# Patient Record
(demographics unavailable — no encounter records)

---

## 2021-04-23 NOTE — NUR
PT GIVEN WARM BLANKETS, IN POSITION OF COMFORT. CALL LIGHT WITHIN REACH AND BED
LOW. SIDE RAILS X2. LIGHTS DIMMED FOR COMFORT. UPDATED ON PLAN OF CARE. 
DENIES CURRENT NEEDS.

## 2021-04-24 NOTE — NUR
Lying in bed, awake/alert/oriented, assist to T/R self ad ru, cont of B/B
with BRPs with assist ad ru, denies pain/other discomfort at this time, call
light/phone/water within reach, no s/s of acute distress observed.

## 2021-04-24 NOTE — NUR
ASSESSED AT THE TIME OF ARRIVAL FROM THE ER. PT IS ALERT AND ORIENTED, ABLE TO
VERBALIZE NEEDS. VERY OTIENTED AND NO PROBLEM WITH HEARING. SHE HAS BEEN
ASSISTED UP TO THE BATHROOM TO VOID AND HAD A BM. THERE IS NEED OF STAND BY
ASSIST FOR SAFTEY. HER DAUGHTER-IN-LAW IS ASSISTING HER TO THE BATHAROOM WHEN
WE ARE NOT CALLED. ALL QUESTIONS HAVE BEEN ANSWERED WITH NO COGNITIVE PROBLEMS
NOTED.

## 2021-04-24 NOTE — NUR
Called to room, assisted to bathroom, assisted to change into pajama bottoms,
assisted to wash hands/face then assisted to bed, reoriented on the plan of
her daughter-in-law to return tonight to spend the night with her, reoriented
to use of call light before getting up or if she needs anything, she states
again that she understands.

## 2021-04-24 NOTE — NUR
INITAIL ROUNDS COMPLETEDA T 1915 HRS.  PT UP IN ROOM.  ORIENTED TO PERSON,
ONLY.  REORIENTED TO TIME. PLACE AND SITUATION.  GAIT STEADY.  PACED RHTYM
WITH
UNDERLYING A-FIB PER CM HR 72.  IV TO L WRIST WITH NS AT 75CC/HR.  IV PATENT.
DENIES ANY DISCOMFORT.  CALL LIGHT WITHIN REACH.

## 2021-04-24 NOTE — NUR
Pt daughter asking about pt going home this afternoon, this nurse check
physician's notes but found no note/order stating such, reported back to
daughter who requested that Dr. Walker be called and questioned about them
going home, called Dr. Walker who stated he was ok with pt being discharged if
ok with primary attending. Called OUMOU Carrillo who stated emphatically that Dr. Sesay wanted pt to stay another day, went to room and explained to pt
daughter when son asked why I stated to monitor her and make sure she does not
have another episode of hypotension or low HR, son had additional questions
and wants to speak directly with attending, called OUMOU Carrillo and transferred
call to room.

## 2021-04-25 NOTE — MORECARE
CASE MANAGEMENT DISCHARGE SUMMARY
 
 
PATIENT: SHIRLEY CHAUHAN                    UNIT: [id number]
ACCOUNT#: [id number]                       ADM DATE: 21
AGE: 87     : 34  SEX: F            ROOM/BED: D.2616    
AUTHOR: ANTHONY EDEN                             PHYSICIAN:                               
 
REFERRING PHYSICIAN: FELIX POWERS MD           
DATE OF SERVICE: 21
Case Management Discharge Planning Summary
 
 
COMMENTS 
ENTERED DATE:  21  12:34 CT
COMMENT TYPE:  Discharge Planning
REVIEWER: Sandy Meeks
  
CM met with patient and daughter, Venice Rob ([phone]) to complete 
DC plan and to evaluate needs.  Patient lives with her daughter (Venice) and 
daughter's  in their home.  Patient's daughter stated that her home 
is safe and has electricity and running water.  Patient's daughter stated 
that she has no problems paying for the patient's medications and she fills 
them at [facility] on [facility]. Patient stated that her primary care 
physician is Dr. Woods.  At discharge, the patient plans to return to her 
daughter's home. The patient and her daughter feel this is a safe discharge. 
 CM discussed availability of home health, rehab services, and medical 
equipment. Patient's daughter declined HHS, SNF, IPR, and DME and stated she 
would like to return home to let her mother resume her pre-admission routine.
 Patient uses a cane to assist with ambulation as needed. Venice stated she 
encourages the patient to ambulate frequently at home to maintain her 
strength.  Patient voiced no other needs at this time and is satisfied with 
DC plan. Venice will provide transportation after discharge. CM will continue 
to follow and will assist as needed with dc plans/needs.
 
 
DCP REVIEW SUMMARY
ANTICIPATED D/C DATE: 2021
EXPECTED LOS : 2
CASE STATUS:  DCP Complete
INITIAL REVIEW:  2021
INITIAL REVIEWER:   Sandy Meeks
FINAL DISCHARGE DISPOSITION:  : 
FINAL REVIEWER:  
FINAL REVIEW DATE: 
 
  
 
DCP Focus Questions & Answers
 
 
DCP Screen
QUESTION: ANSWER
High Risk Factors: : None
Walking limitation:  Patient stated self rated walking limitation present? : 
No
Age: : 80 +
Prior living environment: : Lives with others
Disability ranking: : Grade 2:  Slight disability
 
 
 
DCP Evaluation
QUESTION: ANSWER
Patient and/or caregiver agree upon recommended discharge plan? : Yes
Patient's current cognitive status: : *Oriented to person, place, situation, 
time and present
Patient's ability to cope with chronic illness : a. Adequate (0-3 ED visits 
in 6 mos., adequate financial resources, attends scheduled appts.)
Patient gives permission to discuss discharge plans with:  (name, 
relationship and number) : Venice Coe
Family / Caregiver's ability to cope with chronic illness: : a. Adequate 
(ability to meet patient's medical needs, ensures patient attends medical 
appts.)
Does the patient have the ability to pay for or attain post discharge needs 
/ services? : Yes
Functional screen assessment: : Basic needs can adequately be met by self
Family / Caregiver's ability to cope with chronic illness: : a. Adequate 
(ability to meet patient's medical needs, ensures patient attends medical 
appts.)
Physical Status: : Independent with ADL's
Equipment needed for post hospitalization: : None
Is there a likelihood that the patient will require additional services to 
return to the preadmission environment? : No
Living Arrangements: : Home with others
Results of this evaluation have been discussed with: : Patient
Results of this evaluation have been discussed with: : Children
Patient with capacity for self-care or can be cared for in same environment 
as prior to hospitalization? : Yes
Baseline cognitive status: : *Oriented to person, place, situation, time and 
present
Living arrangements comments: : Lives with Venice coe and her 

Comments: : eVnice Coe was present and participated in 
discussion
Physical environment modification needed / anticipated for discharge: : No
Medication Management: : Patient states can afford medications
Planned post hospital services available for patient? : N/A
Pharmacy name(s): : [facility] on [address]
Planned post hospital services covered by insurance plan? : N/A
Does Patient have transportation to get home and to follow-up medical 
appointments when discharged from the hospital? : Yes
Comments: : Daughter provides transportation
 
Would patient like to participate in any Care Coordination programs (if 
applicable): : Not applicable
Does the patient have electricity at home? : Yes
Does the patient have running water in their house? : Yes
Equipment in use: : Walker - Standard
Equipment in use: : Cane - Quad
Mental health screen: : No mental health history
 
 
 
DCP Re-evaluation
QUESTION: ANSWER
Would patient like to participate in any Care Coordination programs (if 
applicable): : Not applicable
 
 
 
 
 
 
 
PATIENT:  SHIRLEY CHAUHAN MARILYN
ENCOUNTER:  [id number]
MEDICAL RECORD#:  [id number]
ADMISSION DATE:  2021
DISCHARGE DATE:  2021
ATTENDING MD:  
REYNA:   
AGE:  87
MARITAL STATUS:   W
DC PLAN ID:  [id number]
FACILITY:   [facility]
PRINTED ON: 21  17:19  CT
 
 
 
 
 
 
 
 
**All edits/amendments must be made on the electronic document**
 
DICTATION DATE: 21     : TEENA  21     
RPT#: [id number]                                DC DATE:21
                                               STATUS: DIS IN  
[facility]
[address] [place] [address]
***END OF REPORT***

## 2021-04-25 NOTE — MORECARE
CASE MANAGEMENT DISCHARGE SUMMARY
 
 
PATIENT: SHIRLEY CHAUHAN                    UNIT: [id number]
ACCOUNT#: [id number]                       ADM DATE: 21
AGE: 87     : 34  SEX: F            ROOM/BED: D.2316    
AUTHOR: ANTHONY EDEN                             PHYSICIAN:                               
 
REFERRING PHYSICIAN: FELIX POWERS MD           
DATE OF SERVICE: 21
Case Management Discharge Planning Summary
 
 
COMMENTS 
ENTERED DATE:  21  12:34 CT
COMMENT TYPE:  Discharge Planning
REVIEWER: Sandy Meeks
  
CM met with patient and daughter, Venice Rob ([phone]) to complete 
DC plan and to evaluate needs.  Patient lives with her daughter (Venice) and 
daughter's  in their home.  Patient's daughter stated that her home 
is safe and has electricity and running water.  Patient's daughter stated 
that she has no problems paying for the patient's medications and she fills 
them at [facility] on [facility]. Patient stated that her primary care 
physician is Dr. Woods.  At discharge, the patient plans to return to her 
daughter's home. The patient and her daughter feel this is a safe discharge. 
 CM discussed availability of home health, rehab services, and medical 
equipment. Patient's daughter declined HHS, SNF, IPR, and DME and stated she 
would like to return home to let her mother resume her pre-admission routine.
 Patient uses a cane to assist with ambulation as needed. Venice stated she 
encourages the patient to ambulate frequently at home to maintain her 
strength.  Patient voiced no other needs at this time and is satisfied with 
DC plan. Venice will provide transportation after discharge. CM will continue 
to follow and will assist as needed with dc plans/needs.
 
 
DCP REVIEW SUMMARY
ANTICIPATED D/C DATE: 2021
EXPECTED LOS : 2
CASE STATUS:  DCP Complete
INITIAL REVIEW:  2021
INITIAL REVIEWER:   Sandy Meeks
FINAL DISCHARGE DISPOSITION:  : 
FINAL REVIEWER:  
FINAL REVIEW DATE: 
 
  
 
DCP Focus Questions & Answers
 
 
DCP Screen
QUESTION: ANSWER
High Risk Factors: : None
Walking limitation:  Patient stated self rated walking limitation present? : 
No
Age: : 80 +
Prior living environment: : Lives with others
Disability ranking: : Grade 2:  Slight disability
 
 
 
DCP Evaluation
QUESTION: ANSWER
Patient and/or caregiver agree upon recommended discharge plan? : Yes
Family / Caregiver's ability to cope with chronic illness: : a. Adequate 
(ability to meet patient's medical needs, ensures patient attends medical 
appts.)
Patient's current cognitive status: : *Oriented to person, place, situation, 
time and present
Patient gives permission to discuss discharge plans with:  (name, 
relationship and number) : Venice Coe
Patient's ability to cope with chronic illness : a. Adequate (0-3 ED visits 
in 6 mos., adequate financial resources, attends scheduled appts.)
Does the patient have the ability to pay for or attain post discharge needs 
/ services? : Yes
Functional screen assessment: : Basic needs can adequately be met by self
Family / Caregiver's ability to cope with chronic illness: : a. Adequate 
(ability to meet patient's medical needs, ensures patient attends medical 
appts.)
Physical Status: : Independent with ADL's
Equipment needed for post hospitalization: : None
Is there a likelihood that the patient will require additional services to 
return to the preadmission environment? : No
Living Arrangements: : Home with others
Results of this evaluation have been discussed with: : Patient
Results of this evaluation have been discussed with: : Children
Patient with capacity for self-care or can be cared for in same environment 
as prior to hospitalization? : Yes
Living arrangements comments: : Lives with Venice coe and her 

Baseline cognitive status: : *Oriented to person, place, situation, time and 
present
Physical environment modification needed / anticipated for discharge: : No
Comments: : Venice Ceo was present and participated in 
discussion
Medication Management: : Patient states can afford medications
Planned post hospital services available for patient? : N/A
Pharmacy name(s): : [facility] on [address]
Planned post hospital services covered by insurance plan? : N/A
Does Patient have transportation to get home and to follow-up medical 
appointments when discharged from the hospital? : Yes
Comments: : Daughter provides transportation
 
Would patient like to participate in any Care Coordination programs (if 
applicable): : Not applicable
Does the patient have electricity at home? : Yes
Does the patient have running water in their house? : Yes
Equipment in use: : Walker - Standard
Equipment in use: : Cane - Quad
Mental health screen: : No mental health history
 
 
 
DCP Re-evaluation
QUESTION: ANSWER
Would patient like to participate in any Care Coordination programs (if 
applicable): : Not applicable
 
 
 
 
 
 
 
PATIENT:  SHIRLEY CHAUHAN MARILYN
ENCOUNTER:  [id number]
MEDICAL RECORD#:  [id number]
ADMISSION DATE:  2021
DISCHARGE DATE:  
ATTENDING MD:  
REYNA:   
AGE:  87
MARITAL STATUS:   W
DC PLAN ID:  [id number]
FACILITY:   [facility]
PRINTED ON: 21  12:45  CT
 
 
 
 
 
 
 
 
**All edits/amendments must be made on the electronic document**
 
DICTATION DATE: 21     : TEENA  21     
RPT#: [id number]                                DC DATE:        
                                               STATUS: ADM IN  
[facility]
 [place] [address]
***END OF REPORT***

## 2021-04-25 NOTE — MORECARE
CASE MANAGEMENT DISCHARGE SUMMARY
 
 
PATIENT: SHIRLEY CHAUHAN                    UNIT: [id number]
ACCOUNT#: [id number]                       ADM DATE: 21
AGE: 87     : 34  SEX: F            ROOM/BED: D.5686    
AUTHOR: MEEKDOC                             PHYSICIAN:                               
 
REFERRING PHYSICIAN: FELIX POWERS MD           
DATE OF SERVICE: 21
Case Management Discharge Planning Summary
 
 
 
 
 
DCP REVIEW SUMMARY
ANTICIPATED D/C DATE: 2021
EXPECTED LOS : 2
CASE STATUS:  DCP Complete
INITIAL REVIEW:  2021
INITIAL REVIEWER:   Sandy Meeks
FINAL DISCHARGE DISPOSITION:  : 
FINAL REVIEWER:  
FINAL REVIEW DATE: 
 
  
 
DCP Focus Questions & Answers
 
DCP Screen
QUESTION: ANSWER
High Risk Factors: : None
Walking limitation:  Patient stated self rated walking limitation present? : 
No
Age: : 80 +
Prior living environment: : Lives with others
Disability ranking: : Grade 2:  Slight disability
 
 
 
DCP Evaluation
QUESTION: ANSWER
Patient and/or caregiver agree upon recommended discharge plan? : Yes
Family / Caregiver's ability to cope with chronic illness: : a. Adequate 
(ability to meet patient's medical needs, ensures patient attends medical 
appts.)
Patient's current cognitive status: : *Oriented to person, place, situation, 
time and present
Patient gives permission to discuss discharge plans with:  (name, 
 
relationship and number) : DaughterVenice
Patient's ability to cope with chronic illness : a. Adequate (0-3 ED visits 
in 6 mos., adequate financial resources, attends scheduled appts.)
Does the patient have the ability to pay for or attain post discharge needs 
/ services? : Yes
Functional screen assessment: : Basic needs can adequately be met by self
Family / Caregiver's ability to cope with chronic illness: : a. Adequate 
(ability to meet patient's medical needs, ensures patient attends medical 
appts.)
Physical Status: : Independent with ADL's
Equipment needed for post hospitalization: : None
Is there a likelihood that the patient will require additional services to 
return to the preadmission environment? : No
Living Arrangements: : Home with others
Results of this evaluation have been discussed with: : Patient
Results of this evaluation have been discussed with: : Children
Patient with capacity for self-care or can be cared for in same environment 
as prior to hospitalization? : Yes
Living arrangements comments: : Lives with daughterVenice and her 

Baseline cognitive status: : *Oriented to person, place, situation, time and 
present
Physical environment modification needed / anticipated for discharge: : No
Comments: : Daughter, Venice Rob was present and participated in 
discussion
Medication Management: : Patient states can afford medications
Planned post hospital services available for patient? : N/A
Pharmacy name(s): : [facility] on [address]
Planned post hospital services covered by insurance plan? : N/A
Does Patient have transportation to get home and to follow-up medical 
appointments when discharged from the hospital? : Yes
Comments: : Daughter provides transportation
Would patient like to participate in any Care Coordination programs (if 
applicable): : Not applicable
Does the patient have electricity at home? : Yes
Does the patient have running water in their house? : Yes
Equipment in use: : Walker - Standard
Equipment in use: : Cane - Quad
Mental health screen: : No mental health history
 
 
 
DCP Re-evaluation
QUESTION: ANSWER
Would patient like to participate in any Care Coordination programs (if 
applicable): : Not applicable
 
 
 
 
 
 
 
 
PATIENT:  SHIRLEY CHAUHAN
ENCOUNTER:  [id number]
MEDICAL RECORD#:  [id number]
ADMISSION DATE:  2021
DISCHARGE DATE:  
ATTENDING MD:  
REYNA:   
AGE:  87
MARITAL STATUS:   W
DC PLAN ID:  [id number]
FACILITY:   [facility]
PRINTED ON: 21  12:34  CT
 
 
 
 
 
 
 
 
**All edits/amendments must be made on the electronic document**
 
DICTATION DATE: 21 123     : TEENA  21 1234     
RPT#: [id number]                                DC DATE:        
                                               STATUS: ADM IN  
[facility]
 [place] [address]
***END OF REPORT***

## 2021-04-25 NOTE — NUR
PT'S DISCHARGE INSTRUCTIONS REVIEWED WITH PT AND DAUGHTER IN LAW PRIOR TO
SIGNING.  IV OUT AND TELEMETRY REMOVED.  WHEELED TO ER FOR TRANSPORT HOME.

## 2021-04-25 NOTE — NUR
PT AWAKE EASILY TO VERBAL STIMULI.  DENIES ANY DISCOMFORT.  ORIENTED TO PERSON
ONLY.  REORIENTED TO PLACE, TIME AND SITUATION.  FAMILY AT BEDSIDE.  SR UP X2,
CALL LIGHT WITHIN REACH.

## 2021-04-28 NOTE — MORECARE
CASE MANAGEMENT DISCHARGE SUMMARY
 
 
PATIENT: SHIRLEY CHAUHAN                    UNIT: [id number]
ACCOUNT#: [id number]                       ADM DATE: 21
AGE: 87     : 34  SEX: F            ROOM/BED: D.8216    
AUTHOR: ANTHONY EDEN                             PHYSICIAN:                               
 
REFERRING PHYSICIAN: FELIX POWERS MD           
DATE OF SERVICE: 21
Case Management Discharge Planning Summary
 
 
COMMENTS 
ENTERED DATE:  21  12:34 CT
COMMENT TYPE:  Discharge Planning
REVIEWER: Sandy Meeks
  
CM met with patient and daughter, Venice Rob ([phone]) to complete 
DC plan and to evaluate needs.  Patient lives with her daughter (Venice) and 
daughter's  in their home.  Patient's daughter stated that her home 
is safe and has electricity and running water.  Patient's daughter stated 
that she has no problems paying for the patient's medications and she fills 
them at [facility] on [facility]. Patient stated that her primary care 
physician is Dr. Woods.  At discharge, the patient plans to return to her 
daughter's home. The patient and her daughter feel this is a safe discharge. 
 CM discussed availability of home health, rehab services, and medical 
equipment. Patient's daughter declined HHS, SNF, IPR, and DME and stated she 
would like to return home to let her mother resume her pre-admission routine.
 Patient uses a cane to assist with ambulation as needed. Venice stated she 
encourages the patient to ambulate frequently at home to maintain her 
strength.  Patient voiced no other needs at this time and is satisfied with 
DC plan. Venice will provide transportation after discharge. CM will continue 
to follow and will assist as needed with dc plans/needs.
 
 
DCP REVIEW SUMMARY
ANTICIPATED D/C DATE: 2021
EXPECTED LOS : 2
CASE STATUS:  DCP Complete
INITIAL REVIEW:  2021
INITIAL REVIEWER:   Sandy Meeks
FINAL DISCHARGE DISPOSITION:  : 
FINAL REVIEWER:  
FINAL REVIEW DATE: 
 
  
 
DCP Focus Questions & Answers
 
 
DCP Screen
QUESTION: ANSWER
High Risk Factors: : None
Walking limitation:  Patient stated self rated walking limitation present? : 
No
Age: : 80 +
Prior living environment: : Lives with others
Disability ranking: : Grade 2:  Slight disability
 
 
 
DCP Evaluation
QUESTION: ANSWER
Patient and/or caregiver agree upon recommended discharge plan? : Yes
Family / Caregiver's ability to cope with chronic illness: : a. Adequate 
(ability to meet patient's medical needs, ensures patient attends medical 
appts.)
Patient's current cognitive status: : *Oriented to person, place, situation, 
time and present
Patient gives permission to discuss discharge plans with:  (name, 
relationship and number) : Venice Coe
Patient's ability to cope with chronic illness : a. Adequate (0-3 ED visits 
in 6 mos., adequate financial resources, attends scheduled appts.)
Does the patient have the ability to pay for or attain post discharge needs 
/ services? : Yes
Functional screen assessment: : Basic needs can adequately be met by self
Family / Caregiver's ability to cope with chronic illness: : a. Adequate 
(ability to meet patient's medical needs, ensures patient attends medical 
appts.)
Physical Status: : Independent with ADL's
Equipment needed for post hospitalization: : None
Is there a likelihood that the patient will require additional services to 
return to the preadmission environment? : No
Living Arrangements: : Home with others
Results of this evaluation have been discussed with: : Patient
Results of this evaluation have been discussed with: : Children
Patient with capacity for self-care or can be cared for in same environment 
as prior to hospitalization? : Yes
Living arrangements comments: : Lives with Venice coe and her 

Baseline cognitive status: : *Oriented to person, place, situation, time and 
present
Physical environment modification needed / anticipated for discharge: : No
Comments: : Venice Coe was present and participated in 
discussion
Medication Management: : Patient states can afford medications
Planned post hospital services available for patient? : N/A
Pharmacy name(s): : [facility] on [address]
Planned post hospital services covered by insurance plan? : N/A
Does Patient have transportation to get home and to follow-up medical 
appointments when discharged from the hospital? : Yes
Comments: : Daughter provides transportation
 
Would patient like to participate in any Care Coordination programs (if 
applicable): : Not applicable
Does the patient have electricity at home? : Yes
Does the patient have running water in their house? : Yes
Equipment in use: : Walker - Standard
Equipment in use: : Cane - Quad
Mental health screen: : No mental health history
 
 
 
DCP Re-evaluation
QUESTION: ANSWER
Would patient like to participate in any Care Coordination programs (if 
applicable): : Not applicable
 
 
 
 
 
 
 
PATIENT:  SHIRLEY CHAUHAN MARILYN
ENCOUNTER:  [id number]
MEDICAL RECORD#:  [id number]
ADMISSION DATE:  2021
DISCHARGE DATE:  2021
ATTENDING MD:  
REYNA:   [id number]
AGE:  87
MARITAL STATUS:   W
DC PLAN ID:  [id number]
FACILITY:   [facility]
PRINTED ON: 21  20:22  CT
 
 
 
 
 
 
 
 
**All edits/amendments must be made on the electronic document**
 
DICTATION DATE: 21     : TEENA  21     
RPT#: [id number]                                DC DATE:21
                                               STATUS: DIS IN  
[facility]
[address] [place] [address]
***END OF REPORT***

## 2021-05-03 NOTE — NUR
REPORT RECEIVED. PT A&O X4; HAS MOMENTS OF CONFUSION BUT EASILY REORIENTED.
DAUGHTER IN LAW AT BEDSIDE. NO S/S OF DISTRESS OBSERVED. RR EVEN & UNLABORED
ON RA. R FA IV PATENT WITH NS AND K INFUSING. TELE PACED AT 65. BED LOCKED
AND LOWERED, CL IN REACH. ASSESSMENT COMPLETE. WILL CONT POC.

## 2021-05-04 NOTE — MORECARE
CASE MANAGEMENT DISCHARGE SUMMARY
 
 
PATIENT: SHIRLEY DOSS                    UNIT: [id number]
ACCOUNT#: [id number]                       ADM DATE: 21
AGE: 87     : 34  SEX: F            ROOM/BED: D0    
AUTHOR: ANTHONY EDEN                             PHYSICIAN:                               
 
REFERRING PHYSICIAN: NICOL SOTO MD                
DATE OF SERVICE: 21
Case Management Discharge Planning Summary
 
 
 
 
 
DCP REVIEW SUMMARY
ANTICIPATED D/C DATE: 
EXPECTED LOS : 
CASE STATUS:  DCP Complete
INITIAL REVIEW:  2021
INITIAL REVIEWER:   Kayley Wilks
FINAL DISCHARGE DISPOSITION:  : 
FINAL REVIEWER:  
FINAL REVIEW DATE: 
 
  
 
DCP Focus Questions & Answers
 
DCP Screen
QUESTION: ANSWER
High Risk Factors: : Readmission within past 30 days
 
 
 
DCP Evaluation
QUESTION: ANSWER
Patient's ability to cope with chronic illness : d. No chronic illness
Would patient like to participate in any Care Coordination programs (if 
applicable): : Not applicable
Mental health screen: : No mental health history
 
 
 
DCP Re-evaluation
QUESTION: ANSWER
Would patient like to participate in any Care Coordination programs (if 
applicable): : Not applicable
 
 
 
 
 
 
 
 
PATIENT:  SHIRLEY DOSS
ENCOUNTER:  [id number]
MEDICAL RECORD#:  [id number]
ADMISSION DATE:  2021
DISCHARGE DATE:  
ATTENDING MD:  NICOL URRUTIA
:   
AGE:  87
MARITAL STATUS:   W
DC PLAN ID:  [id number]
FACILITY:   [facility]
PRINTED ON: 21  16:06  CT
 
 
 
 
 
 
 
 
**All edits/amendments must be made on the electronic document**
 
DICTATION DATE: 21     : TEENA  21 160     
RPT#: [id number]                                DC DATE:        
                                               STATUS: ADM IN  
[facility]
 [place] [address]
***END OF REPORT***

## 2021-05-04 NOTE — NUR
REPORT RECEIVED. PT A&O, UP IN BED WITH SON AT BEDSIDE. NO S/S OF DISTRESS
OBSERVED. RR EVEN & UNLABORED ON 2L. IV TO R FA INFUSING NS @ 150. K STAT
REDRAW WAS 2.6. GAVE 1/3 20MEQ K PILLS. WILL TREAT PER EMAR. PT WAS BATHED AND
LATHERED WITH LOTION. BED LOCKED AND LOWERED. CL IN REACH. ASSESSMENT
COMPLETE. WILL CONT POC.

## 2021-05-04 NOTE — NUR
Lying in bed, awake/alert/confused, daughter-in-law at bedside, T/R with
assist ad ru, cont of B/B with use of BSC with assist ad ru, does have
episodes of incont while sleeping from time-to-time, denies pain/other
discomfort at this time, medicated as ordered (See MAR), call
light/phone/water within reach, no s/s of acute distress observed.

## 2021-05-04 NOTE — MORECARE
CASE MANAGEMENT DISCHARGE SUMMARY
 
 
PATIENT: SHIRLEY MENDEZ                    UNIT: [id number]
ACCOUNT#: [id number]                       ADM DATE: 21
AGE: 87     : 34  SEX: F            ROOM/BED: D.2110    
AUTHOR: MEEKDOC                             PHYSICIAN:                               
 
REFERRING PHYSICIAN: NICOL SOTO MD                
DATE OF SERVICE: 21
Case Management Discharge Planning Summary
 
 
COMMENTS 
ENTERED DATE:  21  16:08 CT
COMMENT TYPE:  Discharge Planning
REVIEWER: Kayley Wilks
DC PLAN: Would like either inpatient rehab at [facility] or SNF  
ANTICIPATED DC NEEDS: IP rehab or SNF  
  
CM met with patient and her DIL to completed DC planning/needs. CM educated 
patient on the CM role and verbal consent given by patient to complete 
assessment.  CM verified patient's address, phone number, and emergency 
contact phone numbers.  Patient lives at home with her DIL, Venice and her 
son.   CM discussed availability of home health, rehab services, and medical 
equipment. Patient and DIL state she will need some rehab. I discussed IP 
rehab vs skilled and provided a list of facilities. Daughter in law would 
like to discuss this with her  and let me know tomorrow of their 
choice. CM will continue to follow and assist with discharge planning/needs.
 
 
DCP REVIEW SUMMARY
ANTICIPATED D/C DATE: 
EXPECTED LOS : 
CASE STATUS:  DCP Complete
INITIAL REVIEW:  2021
INITIAL REVIEWER:   Kayley Wilks
FINAL DISCHARGE DISPOSITION:  : 
FINAL REVIEWER:  
FINAL REVIEW DATE: 
 
  
 
DCP Focus Questions & Answers
 
DCP Screen
QUESTION: ANSWER
High Risk Factors: : Readmission within past 30 days
 
 
 
 
DCP Evaluation
QUESTION: ANSWER
Patient gives permission to discuss discharge plans with:  (name, 
relationship and number) : Venice Mendez - DIL - [phone]
Patient's ability to cope with chronic illness : a. Adequate (0-3 ED visits 
in 6 mos., adequate financial resources, attends scheduled appts.)
Patient's current cognitive status: : Alert
Patient and/or caregiver agree upon recommended discharge plan? : Yes
Physical Status: : Mobility impaired
Physical Status: : Partial care dependence
Family / Caregiver's ability to cope with chronic illness: : a. Adequate 
(ability to meet patient's medical needs, ensures patient attends medical 
appts.)
Functional screen assessment: : New onset in difficulty in gait, balance, or 
transfer difficulties
Does the patient have the ability to pay for or attain post discharge needs 
/ services? : Yes
Partial Dependence, assistance required for: : Ambulation / Mobility
Partial Dependence, assistance required for: : Bathing
Partial Dependence, assistance required for: : Dressing
Living Arrangements: : Home with others
Is there a likelihood that the patient will require additional services to 
return to the preadmission environment? : Yes
Equipment needed for post hospitalization: : None
Living arrangements comments: : Lives with her son and DIL
Baseline cognitive status: : Alert
Results of this evaluation have been discussed with: : Family
Results of this evaluation have been discussed with: : Patient
Physical environment modification needed / anticipated for discharge: : No
Medication Management: : Patient states the need for assistance with 
medication administration
Pharmacy name(s): : PCP is Dr. Woods
Planned post hospital services available for patient? : Yes
Does Patient have transportation to get home and to follow-up medical 
appointments when discharged from the hospital? : Yes
Would patient like to participate in any Care Coordination programs (if 
applicable): : Not applicable
Does the patient have electricity at home? : Yes
Does the patient have running water in their house? : Yes
Equipment in use: : Cane - Single Leg
Equipment in use: : Walker - Rolling
Mental health screen: : No mental health history
Psychosocial status: : Adult with cognitive limitations
Psychosocial status: : Adult with physical limitations
Abuse/Neglect: : None
Resources / Services in place: : None
 
 
 
DCP Re-evaluation
QUESTION: ANSWER
 
Would patient like to participate in any Care Coordination programs (if 
applicable): : Not applicable
 
 
 
 
 
 
 
PATIENT:  SHIRLEY MENDEZ
ENCOUNTER:  [id number]
MEDICAL RECORD#:  [id number]
ADMISSION DATE:  2021
DISCHARGE DATE:  
ATTENDING MD:  NICOL URRUTIA
:   
AGE:  87
MARITAL STATUS:   W
DC PLAN ID:  [id number]
FACILITY:   [facility]
PRINTED ON: 21  16:18  CT
 
 
 
 
 
 
 
 
**All edits/amendments must be made on the electronic document**
 
DICTATION DATE: 21     : TEENA  21     
RPT#: [id number]                                DC DATE:        
                                               STATUS: ADM IN  
[facility]
 [place] [address]
***END OF REPORT***

## 2021-05-05 NOTE — NUR
REPORT RECEIVED, PT A&O UP IN BED COMPLETEING CROSSWORD PUZZLES. NO S/S OF
DISTRESS OBSERVED. RR EVEN & UNLABORED ON RA. IV TO R FA PATENT AND INFUSING
NS . BED LOCKED AND LOWERED, CL IN REACH. ASSESSMENT COMPLETE. WILL CONT
POC.

## 2021-05-05 NOTE — MORECARE
CASE MANAGEMENT DISCHARGE SUMMARY
 
 
PATIENT: SHIRLEY MENDEZ                    UNIT: [id number]
ACCOUNT#: [id number]                       ADM DATE: 21
AGE: 87     : 34  SEX: F            ROOM/BED: D.2110    
AUTHOR: MEEK,DOC                             PHYSICIAN:                               
 
REFERRING PHYSICIAN: NICOL SOTO MD                
DATE OF SERVICE: 21
Case Management Discharge Planning Summary
 
 
COMMENTS 
ENTERED DATE:  21  16:08 CT
COMMENT TYPE:  Discharge Planning
REVIEWER: Kayley Wilks
DC PLAN: Would like either inpatient rehab at [facility] or SNF  
ANTICIPATED DC NEEDS: IP rehab or SNF  
  
CM met with patient and her DIL to completed DC planning/needs. CM educated 
patient on the CM role and verbal consent given by patient to complete 
assessment.  CM verified patient's address, phone number, and emergency 
contact phone numbers.  Patient lives at home with her DIL, Venice and her 
son.   CM discussed availability of home health, rehab services, and medical 
equipment. Patient and DIL state she will need some rehab. I discussed IP 
rehab vs skilled and provided a list of facilities. Daughter in law would 
like to discuss this with her  and let me know tomorrow of their 
choice. CM will continue to follow and assist with discharge planning/needs.
 
 
DCP REVIEW SUMMARY
ANTICIPATED D/C DATE: 
EXPECTED LOS : 
CASE STATUS:  DCP Complete
INITIAL REVIEW:  2021
INITIAL REVIEWER:   Kayley Wilks
FINAL DISCHARGE DISPOSITION:  : 
FINAL REVIEWER:  
FINAL REVIEW DATE: 
 
  
 
DCP Focus Questions & Answers
 
DCP Screen
QUESTION: ANSWER
High Risk Factors: : Readmission within past 30 days
 
 
 
 
DCP Evaluation
QUESTION: ANSWER
Patient and/or caregiver agree upon recommended discharge plan? : Yes
Patient's current cognitive status: : Alert
Patient's ability to cope with chronic illness : a. Adequate (0-3 ED visits 
in 6 mos., adequate financial resources, attends scheduled appts.)
Patient gives permission to discuss discharge plans with:  (name, 
relationship and number) : [place] Mendez - DIL - [phone]
Does the patient have the ability to pay for or attain post discharge needs 
/ services? : Yes
Functional screen assessment: : New onset in difficulty in gait, balance, or 
transfer difficulties
Family / Caregiver's ability to cope with chronic illness: : a. Adequate 
(ability to meet patient's medical needs, ensures patient attends medical 
appts.)
Physical Status: : Mobility impaired
Physical Status: : Partial care dependence
Equipment needed for post hospitalization: : None
Is there a likelihood that the patient will require additional services to 
return to the preadmission environment? : Yes
Living Arrangements: : Home with others
Partial Dependence, assistance required for: : Ambulation / Mobility
Partial Dependence, assistance required for: : Bathing
Partial Dependence, assistance required for: : Dressing
Results of this evaluation have been discussed with: : Family
Results of this evaluation have been discussed with: : Patient
Baseline cognitive status: : Alert
Living arrangements comments: : Lives with her son and DIL
Physical environment modification needed / anticipated for discharge: : No
Medication Management: : Patient states the need for assistance with 
medication administration
Planned post hospital services available for patient? : Yes
Pharmacy name(s): : PCP is Dr. Woods
Does Patient have transportation to get home and to follow-up medical 
appointments when discharged from the hospital? : Yes
Would patient like to participate in any Care Coordination programs (if 
applicable): : Not applicable
Does the patient have electricity at home? : Yes
Does the patient have running water in their house? : Yes
Equipment in use: : Cane - Single Leg
Equipment in use: : Walker - Rolling
Mental health screen: : No mental health history
Psychosocial status: : Adult with cognitive limitations
Psychosocial status: : Adult with physical limitations
Abuse/Neglect: : None
Resources / Services in place: : None
 
 
 
DCP Re-evaluation
QUESTION: ANSWER
 
Would patient like to participate in any Care Coordination programs (if 
applicable): : Not applicable
 
 
 
 
 
 
 
PATIENT:  SHIRLEY MENDEZ
ENCOUNTER:  [id number]
MEDICAL RECORD#:  [id number]
ADMISSION DATE:  2021
DISCHARGE DATE:  
ATTENDING MD:  NICOL URRUTIA
:   
AGE:  87
MARITAL STATUS:   W
DC PLAN ID:  [id number]
FACILITY:   [facility]
PRINTED ON: 21  12:06  CT
 
 
 
 
 
 
 
 
**All edits/amendments must be made on the electronic document**
 
DICTATION DATE: 21     : TEENA  21     
RPT#: [id number]                                DC DATE:        
                                               STATUS: ADM IN  
[facility]
 [place] [address]
***END OF REPORT***

## 2021-05-05 NOTE — MORECARE
CASE MANAGEMENT DISCHARGE SUMMARY
 
 
PATIENT: SHIRLEY MENDEZ                    UNIT: [id number]
ACCOUNT#: [id number]                       ADM DATE: 21
AGE: 87     : 34  SEX: F            ROOM/BED: D.7900    
AUTHOR: MEEK,DOC                             PHYSICIAN:                               
 
REFERRING PHYSICIAN: NICOL SOTO MD                
DATE OF SERVICE: 21
Case Management Discharge Planning Summary
 
 
COMMENTS 
ENTERED DATE:  21  15:42 CT
COMMENT TYPE:  Discharge Planning
REVIEWER: Kayley Lashaun
Still no therapy notes to fax to [facility]. CM called [place] with  PT. CM 
will need to fax therapy notes when available.
__________________________________________________
ENTERED DATE:  21  12:06 CT
COMMENT TYPE:  Discharge Planning
REVIEWER: Kayley Lashaun
CM met with daughter and she has chosen [facility]. States her mother has 
been there before. CM spoke with PT and they will document their notes, they 
will need faxed to [facility]. I called [facility] and left a voice mail for 
Celeste Fagan that I have faxed a new referral ready for DC. CM will 
continue to follow and assist with discharge planning/needs.
__________________________________________________
ENTERED DATE:  21  16:08 CT
COMMENT TYPE:  Discharge Planning
REVIEWER: Kayley Lashaun
DC PLAN: Would like either inpatient rehab at [facility] or SNF  
ANTICIPATED DC NEEDS: IP rehab or SNF  
  
CM met with patient and her DIL to completed DC planning/needs. CM educated 
patient on the CM role and verbal consent given by patient to complete 
assessment.  CM verified patient's address, phone number, and emergency 
contact phone numbers.  Patient lives at home with her DIL, Venice and her 
son.   CM discussed availability of home health, rehab services, and medical 
equipment. Patient and DIL state she will need some rehab. I discussed IP 
rehab vs skilled and provided a list of facilities. Daughter in law would 
like to discuss this with her  and let me know tomorrow of their 
choice. CM will continue to follow and assist with discharge planning/needs.
 
 
DCP REVIEW SUMMARY
ANTICIPATED D/C DATE: 
EXPECTED LOS : 
CASE STATUS:  DCP Complete
 
INITIAL REVIEW:  2021
INITIAL REVIEWER:   Kayley Wilks
FINAL DISCHARGE DISPOSITION:  : 
FINAL REVIEWER:  
FINAL REVIEW DATE: 
 
  
 
DCP Focus Questions & Answers
 
DCP Screen
QUESTION: ANSWER
High Risk Factors: : Readmission within past 30 days
 
 
 
DCP Evaluation
QUESTION: ANSWER
Patient and/or caregiver agree upon recommended discharge plan? : Yes
Patient's current cognitive status: : Alert
Patient's ability to cope with chronic illness : a. Adequate (0-3 ED visits 
in 6 mos., adequate financial resources, attends scheduled appts.)
Patient gives permission to discuss discharge plans with:  (name, 
relationship and number) : [place] Mendez - DIL - [phone]
Does the patient have the ability to pay for or attain post discharge needs 
/ services? : Yes
Functional screen assessment: : New onset in difficulty in gait, balance, or 
transfer difficulties
Family / Caregiver's ability to cope with chronic illness: : a. Adequate 
(ability to meet patient's medical needs, ensures patient attends medical 
appts.)
Physical Status: : Mobility impaired
Physical Status: : Partial care dependence
Equipment needed for post hospitalization: : None
Is there a likelihood that the patient will require additional services to 
return to the preadmission environment? : Yes
Living Arrangements: : Home with others
Partial Dependence, assistance required for: : Ambulation / Mobility
Partial Dependence, assistance required for: : Bathing
Partial Dependence, assistance required for: : Dressing
Results of this evaluation have been discussed with: : Family
Results of this evaluation have been discussed with: : Patient
Baseline cognitive status: : Alert
Living arrangements comments: : Lives with her son and DIL
Physical environment modification needed / anticipated for discharge: : No
Medication Management: : Patient states the need for assistance with 
medication administration
Planned post hospital services available for patient? : Yes
Pharmacy name(s): : PCP is Dr. Woods
Does Patient have transportation to get home and to follow-up medical 
appointments when discharged from the hospital? : Yes
Would patient like to participate in any Care Coordination programs (if 
 
applicable): : Not applicable
Does the patient have electricity at home? : Yes
Does the patient have running water in their house? : Yes
Equipment in use: : Cane - Single Leg
Equipment in use: : Walker - Rolling
Mental health screen: : No mental health history
Psychosocial status: : Adult with cognitive limitations
Psychosocial status: : Adult with physical limitations
Abuse/Neglect: : None
Resources / Services in place: : None
 
 
 
DCP Re-evaluation
QUESTION: ANSWER
Would patient like to participate in any Care Coordination programs (if 
applicable): : Not applicable
 
 
 
 
 
 
 
PATIENT:  SHIRLEY MENDEZ
ENCOUNTER:  [id number]
MEDICAL RECORD#:  [id number]
ADMISSION DATE:  2021
DISCHARGE DATE:  
ATTENDING MD:  NICOL URRUTIA
:   
AGE:  87
MARITAL STATUS:   W
DC PLAN ID:  [id number]
 
FACILITY:   [facility]
PRINTED ON: 21  16:30  CT
 
 
 
 
 
 
 
 
**All edits/amendments must be made on the electronic document**
 
DICTATION DATE: 21     : TEENA  21 163     
RPT#: [id number]                                DC DATE:        
                                               STATUS: ADM IN  
[facility]
 [place] [address]
***END OF REPORT***

## 2021-05-05 NOTE — MORECARE
CASE MANAGEMENT DISCHARGE SUMMARY
 
 
PATIENT: SHIRLEY MENDEZ                    UNIT: [id number]
ACCOUNT#: [id number]                       ADM DATE: 21
AGE: 87     : 34  SEX: F            ROOM/BED: D.0720    
AUTHOR: MEEK,DOC                             PHYSICIAN:                               
 
REFERRING PHYSICIAN: NICOL SOTO MD                
DATE OF SERVICE: 21
Case Management Discharge Planning Summary
 
 
COMMENTS 
ENTERED DATE:  21  15:42 CT
COMMENT TYPE:  Discharge Planning
REVIEWER: Kayley Lashaun
Still no therapy notes to fax to [facility]. CM called [place] with  PT. CM 
will need to fax therapy notes when available.
__________________________________________________
ENTERED DATE:  21  12:06 CT
COMMENT TYPE:  Discharge Planning
REVIEWER: Kayley Lashaun
CM met with daughter and she has chosen [facility]. States her mother has 
been there before. CM spoke with PT and they will document their notes, they 
will need faxed to [facility]. I called [facility] and left a voice mail for 
Celeste Fagan that I have faxed a new referral ready for DC. CM will 
continue to follow and assist with discharge planning/needs.
__________________________________________________
ENTERED DATE:  21  16:08 CT
COMMENT TYPE:  Discharge Planning
REVIEWER: Kayley Lashaun
DC PLAN: Would like either inpatient rehab at [facility] or SNF  
ANTICIPATED DC NEEDS: IP rehab or SNF  
  
CM met with patient and her DIL to completed DC planning/needs. CM educated 
patient on the CM role and verbal consent given by patient to complete 
assessment.  CM verified patient's address, phone number, and emergency 
contact phone numbers.  Patient lives at home with her DIL, Venice and her 
son.   CM discussed availability of home health, rehab services, and medical 
equipment. Patient and DIL state she will need some rehab. I discussed IP 
rehab vs skilled and provided a list of facilities. Daughter in law would 
like to discuss this with her  and let me know tomorrow of their 
choice. CM will continue to follow and assist with discharge planning/needs.
 
 
DCP REVIEW SUMMARY
ANTICIPATED D/C DATE: 
EXPECTED LOS : 
CASE STATUS:  DCP Complete
 
INITIAL REVIEW:  2021
INITIAL REVIEWER:   Kayley Wilks
FINAL DISCHARGE DISPOSITION:  : 
FINAL REVIEWER:  
FINAL REVIEW DATE: 
 
  
 
DCP Focus Questions & Answers
 
DCP Screen
QUESTION: ANSWER
High Risk Factors: : Readmission within past 30 days
 
 
 
DCP Evaluation
QUESTION: ANSWER
Patient and/or caregiver agree upon recommended discharge plan? : Yes
Patient's current cognitive status: : Alert
Patient's ability to cope with chronic illness : a. Adequate (0-3 ED visits 
in 6 mos., adequate financial resources, attends scheduled appts.)
Patient gives permission to discuss discharge plans with:  (name, 
relationship and number) : [place] Mendez - DIL - [phone]
Does the patient have the ability to pay for or attain post discharge needs 
/ services? : Yes
Functional screen assessment: : New onset in difficulty in gait, balance, or 
transfer difficulties
Family / Caregiver's ability to cope with chronic illness: : a. Adequate 
(ability to meet patient's medical needs, ensures patient attends medical 
appts.)
Physical Status: : Mobility impaired
Physical Status: : Partial care dependence
Equipment needed for post hospitalization: : None
Is there a likelihood that the patient will require additional services to 
return to the preadmission environment? : Yes
Living Arrangements: : Home with others
Partial Dependence, assistance required for: : Ambulation / Mobility
Partial Dependence, assistance required for: : Bathing
Partial Dependence, assistance required for: : Dressing
Results of this evaluation have been discussed with: : Family
Results of this evaluation have been discussed with: : Patient
Baseline cognitive status: : Alert
Living arrangements comments: : Lives with her son and DIL
Physical environment modification needed / anticipated for discharge: : No
Medication Management: : Patient states the need for assistance with 
medication administration
Planned post hospital services available for patient? : Yes
Pharmacy name(s): : PCP is Dr. Woods
Does Patient have transportation to get home and to follow-up medical 
appointments when discharged from the hospital? : Yes
Would patient like to participate in any Care Coordination programs (if 
 
applicable): : Not applicable
Does the patient have electricity at home? : Yes
Does the patient have running water in their house? : Yes
Equipment in use: : Cane - Single Leg
Equipment in use: : Walker - Rolling
Mental health screen: : No mental health history
Psychosocial status: : Adult with cognitive limitations
Psychosocial status: : Adult with physical limitations
Abuse/Neglect: : None
Resources / Services in place: : None
 
 
 
DCP Re-evaluation
QUESTION: ANSWER
Would patient like to participate in any Care Coordination programs (if 
applicable): : Not applicable
 
 
 
 
 
 
 
PATIENT:  SHIRLEY MENDEZ
ENCOUNTER:  [id number]
MEDICAL RECORD#:  [id number]
ADMISSION DATE:  2021
DISCHARGE DATE:  
ATTENDING MD:  NICOL URRUTIA
:   
AGE:  87
MARITAL STATUS:   W
DC PLAN ID:  [id number]
 
FACILITY:   [facility]
PRINTED ON: 21  15:53  CT
 
 
 
 
 
 
 
 
**All edits/amendments must be made on the electronic document**
 
DICTATION DATE: 21     : TEENA  21     
RPT#: [id number]                                DC DATE:        
                                               STATUS: ADM IN  
[facility]
 [place] [address]
***END OF REPORT***

## 2021-05-05 NOTE — MORECARE
CASE MANAGEMENT DISCHARGE SUMMARY
 
 
PATIENT: SHIRLEY MENDEZ                    UNIT: [id number]
ACCOUNT#: [id number]                       ADM DATE: 21
AGE: 87     : 34  SEX: F            ROOM/BED: D.9730    
AUTHOR: ANTHONY EDEN                             PHYSICIAN:                               
 
REFERRING PHYSICIAN: NICOL SOTO MD                
DATE OF SERVICE: 21
Case Management Discharge Planning Summary
 
 
COMMENTS 
ENTERED DATE:  21  12:06 CT
COMMENT TYPE:  Discharge Planning
REVIEWER: Kayley Wilks
CM met with daughter and she has chosen [facility]. States her mother has 
been there before. CM spoke with PT and they will document their notes, they 
will need faxed to [facility]. I called [facility] and left a voice mail for 
Celeste Fagan that I have faxed a new referral ready for DC. CM will 
continue to follow and assist with discharge planning/needs.
__________________________________________________
ENTERED DATE:  21  16:08 CT
COMMENT TYPE:  Discharge Planning
REVIEWER: Kayley Wilks
DC PLAN: Would like either inpatient rehab at [facility] or SNF  
ANTICIPATED DC NEEDS: IP rehab or SNF  
  
CM met with patient and her DIL to completed DC planning/needs. CM educated 
patient on the CM role and verbal consent given by patient to complete 
assessment.  CM verified patient's address, phone number, and emergency 
contact phone numbers.  Patient lives at home with her DIL, Venice and her 
son.   CM discussed availability of home health, rehab services, and medical 
equipment. Patient and DIL state she will need some rehab. I discussed IP 
rehab vs skilled and provided a list of facilities. Daughter in law would 
like to discuss this with her  and let me know tomorrow of their 
choice. CM will continue to follow and assist with discharge planning/needs.
 
 
DCP REVIEW SUMMARY
ANTICIPATED D/C DATE: 
EXPECTED LOS : 
CASE STATUS:  DCP Complete
INITIAL REVIEW:  2021
INITIAL REVIEWER:   Kayley Wilks
FINAL DISCHARGE DISPOSITION:  : 
FINAL REVIEWER:  
FINAL REVIEW DATE: 
 
 
  
 
DCP Focus Questions & Answers
 
DCP Screen
QUESTION: ANSWER
High Risk Factors: : Readmission within past 30 days
 
 
 
DCP Evaluation
QUESTION: ANSWER
Patient and/or caregiver agree upon recommended discharge plan? : Yes
Patient's current cognitive status: : Alert
Patient's ability to cope with chronic illness : a. Adequate (0-3 ED visits 
in 6 mos., adequate financial resources, attends scheduled appts.)
Patient gives permission to discuss discharge plans with:  (name, 
relationship and number) : Venice Mendez - DIL - [phone]
Does the patient have the ability to pay for or attain post discharge needs 
/ services? : Yes
Functional screen assessment: : New onset in difficulty in gait, balance, or 
transfer difficulties
Family / Caregiver's ability to cope with chronic illness: : a. Adequate 
(ability to meet patient's medical needs, ensures patient attends medical 
appts.)
Physical Status: : Mobility impaired
Physical Status: : Partial care dependence
Equipment needed for post hospitalization: : None
Is there a likelihood that the patient will require additional services to 
return to the preadmission environment? : Yes
Living Arrangements: : Home with others
Partial Dependence, assistance required for: : Ambulation / Mobility
Partial Dependence, assistance required for: : Bathing
Partial Dependence, assistance required for: : Dressing
Results of this evaluation have been discussed with: : Family
Results of this evaluation have been discussed with: : Patient
Baseline cognitive status: : Alert
Living arrangements comments: : Lives with her son and LEEANNA
Physical environment modification needed / anticipated for discharge: : No
Medication Management: : Patient states the need for assistance with 
medication administration
Planned post hospital services available for patient? : Yes
Pharmacy name(s): : PCP is Dr. Woods
Does Patient have transportation to get home and to follow-up medical 
appointments when discharged from the hospital? : Yes
Would patient like to participate in any Care Coordination programs (if 
applicable): : Not applicable
Does the patient have electricity at home? : Yes
Does the patient have running water in their house? : Yes
Equipment in use: : Cane - Single Leg
Equipment in use: : Walker - Rolling
Mental health screen: : No mental health history
 
Psychosocial status: : Adult with cognitive limitations
Psychosocial status: : Adult with physical limitations
Abuse/Neglect: : None
Resources / Services in place: : None
 
 
 
DCP Re-evaluation
QUESTION: ANSWER
Would patient like to participate in any Care Coordination programs (if 
applicable): : Not applicable
 
 
 
 
 
 
 
PATIENT:  SHIRLEY MENDEZ
ENCOUNTER:  [id number]
MEDICAL RECORD#:  [id number]
ADMISSION DATE:  2021
DISCHARGE DATE:  
ATTENDING MD:  NICOL URRUTIA
:   
AGE:  87
MARITAL STATUS:   W
DC PLAN ID:  [id number]
FACILITY:   [facility]
PRINTED ON: 21  12:17  CT
 
 
 
 
 
 
 
 
**All edits/amendments must be made on the electronic document**
 
DICTATION DATE: 21     : TEENA  21     
RPT#: [id number]                                DC DATE:        
                                               STATUS: ADM IN  
[facility]
 [place] [address]
***END OF REPORT***

## 2021-05-06 NOTE — NUR
CALLED REPORT TO JAHAIRA RODRIGUEZ AT [facility]. TRANSPORT ARRIVED TO TRANSFER
PATIENT. IV REMOVED.

## 2021-05-06 NOTE — NUR
Nutrition Follow-up:
Eating well. Noted plans to d/c to rehab.
Diet: Regular
No new wt; last wt: 157# (5/4)
Labs noted: Na 134, Alb 3.2
Meds noted: Protonix, electrolyte protocol
-RD will follow up within 7 days if pt still admitted.

## 2021-05-06 NOTE — NUR
PT SITTING UP IN CHAIR. TALKING WITH NURSE. FEELS BETTER THAN YESTERDAY. LUNGS
CLEAR. ELECTROLYTES GOOD.

## 2021-05-06 NOTE — NUR
OT NOTE: PT COMPLETED SIT TO STAND WITH CGA.PT COMPLETED ADL MOB WITH CGA
USING R/W. PT COMPLETED BUE AROM WITH FUNCTIONAL WALKER MANAGEMENT. PT
COMPLETED HAND HYGIENE AT SINK LEVEL WITH CGA. PT COMPLETED FACE HYGIENE WITH
SETUP.
[id number]
NESSA GOVEA COTA

## 2021-05-07 NOTE — MORECARE
CASE MANAGEMENT DISCHARGE SUMMARY
 
 
PATIENT: SHIRLEY MENDEZ                    UNIT: [id number]
ACCOUNT#: [id number]                       ADM DATE: 21
AGE: 87     : 34  SEX: F            ROOM/BED: D.2320    
AUTHOR: MEEK,DOC                             PHYSICIAN:                               
 
REFERRING PHYSICIAN: NICOL SOTO MD                
DATE OF SERVICE: 21
Case Management Discharge Planning Summary
 
 
COMMENTS 
ENTERED DATE:  21  15:42 CT
COMMENT TYPE:  Discharge Planning
REVIEWER: Kayley Lashaun
Still no therapy notes to fax to [facility]. CM called [place] with  PT. CM 
will need to fax therapy notes when available.
__________________________________________________
ENTERED DATE:  21  12:06 CT
COMMENT TYPE:  Discharge Planning
REVIEWER: Kayley Lashaun
CM met with daughter and she has chosen [facility]. States her mother has 
been there before. CM spoke with PT and they will document their notes, they 
will need faxed to [facility]. I called [facility] and left a voice mail for 
Celeste Fagan that I have faxed a new referral ready for DC. CM will 
continue to follow and assist with discharge planning/needs.
__________________________________________________
ENTERED DATE:  21  16:08 CT
COMMENT TYPE:  Discharge Planning
REVIEWER: Kayley Lashaun
DC PLAN: Would like either inpatient rehab at [facility] or SNF  
ANTICIPATED DC NEEDS: IP rehab or SNF  
  
CM met with patient and her DIL to completed DC planning/needs. CM educated 
patient on the CM role and verbal consent given by patient to complete 
assessment.  CM verified patient's address, phone number, and emergency 
contact phone numbers.  Patient lives at home with her DIL, Venice and her 
son.   CM discussed availability of home health, rehab services, and medical 
equipment. Patient and DIL state she will need some rehab. I discussed IP 
rehab vs skilled and provided a list of facilities. Daughter in law would 
like to discuss this with her  and let me know tomorrow of their 
choice. CM will continue to follow and assist with discharge planning/needs.
 
 
DCP REVIEW SUMMARY
ANTICIPATED D/C DATE: 
EXPECTED LOS : 
CASE STATUS:  DCP Complete
 
INITIAL REVIEW:  2021
INITIAL REVIEWER:   Kayley Wilks
FINAL DISCHARGE DISPOSITION:  : 
FINAL REVIEWER:  
FINAL REVIEW DATE: 
 
  
 
DCP Focus Questions & Answers
 
DCP Screen
QUESTION: ANSWER
High Risk Factors: : Readmission within past 30 days
 
 
 
DCP Evaluation
QUESTION: ANSWER
Patient gives permission to discuss discharge plans with:  (name, 
relationship and number) : Venice Mendez - [facility] - [phone]
Patient's ability to cope with chronic illness : a. Adequate (0-3 ED visits 
in 6 mos., adequate financial resources, attends scheduled appts.)
Patient's current cognitive status: : Alert
Patient and/or caregiver agree upon recommended discharge plan? : Yes
Physical Status: : Mobility impaired
Physical Status: : Partial care dependence
Family / Caregiver's ability to cope with chronic illness: : a. Adequate 
(ability to meet patient's medical needs, ensures patient attends medical 
appts.)
Functional screen assessment: : New onset in difficulty in gait, balance, or 
transfer difficulties
Does the patient have the ability to pay for or attain post discharge needs 
/ services? : Yes
Partial Dependence, assistance required for: : Ambulation / Mobility
Partial Dependence, assistance required for: : Bathing
Partial Dependence, assistance required for: : Dressing
Living Arrangements: : Home with others
Is there a likelihood that the patient will require additional services to 
return to the preadmission environment? : Yes
Equipment needed for post hospitalization: : None
Living arrangements comments: : Lives with her son and DIL
Baseline cognitive status: : Alert
Results of this evaluation have been discussed with: : Family
Results of this evaluation have been discussed with: : Patient
Physical environment modification needed / anticipated for discharge: : No
Medication Management: : Patient states the need for assistance with 
medication administration
Pharmacy name(s): : PCP is Dr. Woods
Planned post hospital services available for patient? : Yes
Does Patient have transportation to get home and to follow-up medical 
appointments when discharged from the hospital? : Yes
Would patient like to participate in any Care Coordination programs (if 
 
applicable): : Not applicable
Does the patient have electricity at home? : Yes
Does the patient have running water in their house? : Yes
Equipment in use: : Cane - Single Leg
Equipment in use: : Walker - Rolling
Mental health screen: : No mental health history
Psychosocial status: : Adult with cognitive limitations
Psychosocial status: : Adult with physical limitations
Abuse/Neglect: : None
Resources / Services in place: : None
 
 
 
DCP Re-evaluation
QUESTION: ANSWER
Would patient like to participate in any Care Coordination programs (if 
applicable): : Not applicable
 
 
 
 
 
 
 
PATIENT:  SHIRLEY MENDEZ
ENCOUNTER:  [id number]
MEDICAL RECORD#:  [id number]
ADMISSION DATE:  2021
DISCHARGE DATE:  2021
ATTENDING MD:  NICOL URRUTIA
:   
AGE:  87
MARITAL STATUS:   W
DC PLAN ID:  [id number]
 
FACILITY:   [facility]
PRINTED ON: 21   2:17  CT
 
 
 
 
 
 
 
 
**All edits/amendments must be made on the electronic document**
 
DICTATION DATE: 21     : TEENA  21     
RPT#: [id number]                                DC DATE:21
                                               STATUS: DIS IN  
[facility]
[address] [place] [address]
***END OF REPORT***

## 2021-05-10 NOTE — MORECARE
CASE MANAGEMENT DISCHARGE SUMMARY
 
 
PATIENT: SHIRLEY MENDEZ                    UNIT: [id number]
ACCOUNT#: [id number]                       ADM DATE: 21
AGE: 87     : 34  SEX: F            ROOM/BED: D.8600    
AUTHOR: MEEK,DOC                             PHYSICIAN:                               
 
REFERRING PHYSICIAN: NICOL SOTO MD                
DATE OF SERVICE: 05/10/21
Case Management Discharge Planning Summary
 
 
COMMENTS 
ENTERED DATE:  21  15:42 CT
COMMENT TYPE:  Discharge Planning
REVIEWER: Kayley Lashaun
Still no therapy notes to fax to [facility]. CM called [place] with  PT. CM 
will need to fax therapy notes when available.
__________________________________________________
ENTERED DATE:  21  12:06 CT
COMMENT TYPE:  Discharge Planning
REVIEWER: Kayley Claytonvirgilio
CM met with daughter and she has chosen [facility]. States her mother has 
been there before. CM spoke with PT and they will document their notes, they 
will need faxed to [facility]. I called [facility] and left a voice mail for 
Celeste Fagan that I have faxed a new referral ready for DC. CM will 
continue to follow and assist with discharge planning/needs.
__________________________________________________
ENTERED DATE:  21  16:08 CT
COMMENT TYPE:  Discharge Planning
REVIEWER: Kayley Lashaun
DC PLAN: Would like either inpatient rehab at [facility] or SNF  
ANTICIPATED DC NEEDS: IP rehab or SNF  
  
CM met with patient and her DIL to completed DC planning/needs. CM educated 
patient on the CM role and verbal consent given by patient to complete 
assessment.  CM verified patient's address, phone number, and emergency 
contact phone numbers.  Patient lives at home with her DIL, Venice and her 
son.   CM discussed availability of home health, rehab services, and medical 
equipment. Patient and DIL state she will need some rehab. I discussed IP 
rehab vs skilled and provided a list of facilities. Daughter in law would 
like to discuss this with her  and let me know tomorrow of their 
choice. CM will continue to follow and assist with discharge planning/needs.
 
 
DCP REVIEW SUMMARY
ANTICIPATED D/C DATE: 
EXPECTED LOS : 
CASE STATUS:  DCP Complete
 
INITIAL REVIEW:  2021
INITIAL REVIEWER:   Kayley Wilks
FINAL DISCHARGE DISPOSITION:  : 
FINAL REVIEWER:  
FINAL REVIEW DATE: 
 
  
 
DCP Focus Questions & Answers
 
DCP Screen
QUESTION: ANSWER
High Risk Factors: : Readmission within past 30 days
 
 
 
DCP Evaluation
QUESTION: ANSWER
Patient gives permission to discuss discharge plans with:  (name, 
relationship and number) : Venice Mendez - [facility] - [phone]
Patient's ability to cope with chronic illness : a. Adequate (0-3 ED visits 
in 6 mos., adequate financial resources, attends scheduled appts.)
Patient and/or caregiver agree upon recommended discharge plan? : Yes
Patient's current cognitive status: : Alert
Physical Status: : Partial care dependence
Physical Status: : Mobility impaired
Does the patient have the ability to pay for or attain post discharge needs 
/ services? : Yes
Functional screen assessment: : New onset in difficulty in gait, balance, or 
transfer difficulties
Family / Caregiver's ability to cope with chronic illness: : a. Adequate 
(ability to meet patient's medical needs, ensures patient attends medical 
appts.)
Partial Dependence, assistance required for: : Dressing
Partial Dependence, assistance required for: : Bathing
Partial Dependence, assistance required for: : Ambulation / Mobility
Equipment needed for post hospitalization: : None
Is there a likelihood that the patient will require additional services to 
return to the preadmission environment? : Yes
Living Arrangements: : Home with others
Living arrangements comments: : Lives with her son and DIL
Baseline cognitive status: : Alert
Results of this evaluation have been discussed with: : Patient
Results of this evaluation have been discussed with: : Family
Physical environment modification needed / anticipated for discharge: : No
Medication Management: : Patient states the need for assistance with 
medication administration
Planned post hospital services available for patient? : Yes
Pharmacy name(s): : PCP is Dr. Woods
Does Patient have transportation to get home and to follow-up medical 
appointments when discharged from the hospital? : Yes
Would patient like to participate in any Care Coordination programs (if 
 
applicable): : Not applicable
Does the patient have electricity at home? : Yes
Does the patient have running water in their house? : Yes
Equipment in use: : Walker - Rolling
Equipment in use: : Cane - Single Leg
Mental health screen: : No mental health history
Psychosocial status: : Adult with physical limitations
Psychosocial status: : Adult with cognitive limitations
Abuse/Neglect: : None
Resources / Services in place: : None
 
 
 
DCP Re-evaluation
QUESTION: ANSWER
Would patient like to participate in any Care Coordination programs (if 
applicable): : Not applicable
 
 
 
 
 
 
 
PATIENT:  SHIRLEY MENDEZ
ENCOUNTER:  [id number]
MEDICAL RECORD#:  [id number]
ADMISSION DATE:  2021
DISCHARGE DATE:  2021
ATTENDING MD:  NICOL URRUTIA
:   
AGE:  87
MARITAL STATUS:   W
DC PLAN ID:  [id number]
 
FACILITY:   [facility]
PRINTED ON: 5/10/21  14:54  CT
 
 
 
 
 
 
 
 
**All edits/amendments must be made on the electronic document**
 
DICTATION DATE: 05/10/21 1454     : TEENA  05/10/21 1454     
RPT#: [id number]                                DC DATE:21
                                               STATUS: DIS IN  
[facility]
 [place] [address]
***END OF REPORT***